# Patient Record
Sex: MALE | Race: WHITE | Employment: OTHER | URBAN - METROPOLITAN AREA
[De-identification: names, ages, dates, MRNs, and addresses within clinical notes are randomized per-mention and may not be internally consistent; named-entity substitution may affect disease eponyms.]

---

## 2022-07-14 ENCOUNTER — HOSPITAL ENCOUNTER (INPATIENT)
Age: 57
LOS: 1 days | Discharge: HOME | DRG: 291 | End: 2022-07-15
Attending: EMERGENCY MEDICINE
Payer: COMMERCIAL

## 2022-07-14 ENCOUNTER — APPOINTMENT (OUTPATIENT)
Dept: GENERAL RADIOLOGY | Age: 57
DRG: 291 | End: 2022-07-14
Payer: COMMERCIAL

## 2022-07-14 DIAGNOSIS — J96.01 ACUTE RESPIRATORY FAILURE WITH HYPOXIA (HCC): ICD-10-CM

## 2022-07-14 DIAGNOSIS — I50.23 ACUTE ON CHRONIC SYSTOLIC CONGESTIVE HEART FAILURE (HCC): Primary | ICD-10-CM

## 2022-07-14 PROBLEM — D64.9 ANEMIA: Status: ACTIVE | Noted: 2022-07-14

## 2022-07-14 PROBLEM — R74.01 ELEVATED LIVER TRANSAMINASE LEVEL: Status: ACTIVE | Noted: 2022-07-14

## 2022-07-14 PROBLEM — F17.200 NICOTINE USE DISORDER: Status: ACTIVE | Noted: 2022-04-14

## 2022-07-14 PROBLEM — E78.5 HYPERLIPIDEMIA: Status: ACTIVE | Noted: 2022-07-14

## 2022-07-14 PROBLEM — R80.9 MICROALBUMINURIA: Status: ACTIVE | Noted: 2022-07-14

## 2022-07-14 PROBLEM — I50.9 CHF (CONGESTIVE HEART FAILURE) (HCC): Status: ACTIVE | Noted: 2022-07-14

## 2022-07-14 PROBLEM — I48.91 ATRIAL FIBRILLATION (HCC): Status: ACTIVE | Noted: 2022-07-14

## 2022-07-14 PROBLEM — I42.8 NONISCHEMIC CARDIOMYOPATHY (HCC): Status: ACTIVE | Noted: 2022-07-14

## 2022-07-14 PROBLEM — N32.89 BLADDER WALL THICKENING: Status: ACTIVE | Noted: 2022-07-14

## 2022-07-14 PROBLEM — E66.9 OBESITY: Status: ACTIVE | Noted: 2022-07-14

## 2022-07-14 PROBLEM — Z79.01 CHRONIC ANTICOAGULATION: Status: ACTIVE | Noted: 2022-07-14

## 2022-07-14 PROBLEM — I10 HYPERTENSION: Status: ACTIVE | Noted: 2022-07-14

## 2022-07-14 PROBLEM — I34.0 MITRAL VALVE REGURGITATION: Status: ACTIVE | Noted: 2022-07-14

## 2022-07-14 PROBLEM — E11.9 TYPE 2 DIABETES MELLITUS (HCC): Status: ACTIVE | Noted: 2022-07-14

## 2022-07-14 PROBLEM — R60.0 BILATERAL LOWER EXTREMITY EDEMA: Status: ACTIVE | Noted: 2022-07-14

## 2022-07-14 PROBLEM — R06.00 DYSPNEA: Status: ACTIVE | Noted: 2022-04-13

## 2022-07-14 LAB
ABSOLUTE EOS #: 0.2 K/UL (ref 0–0.4)
ABSOLUTE LYMPH #: 0.7 K/UL (ref 1–4.8)
ABSOLUTE MONO #: 0.3 K/UL (ref 0.1–1.3)
ALBUMIN SERPL-MCNC: 4.3 G/DL (ref 3.5–5.2)
ALP BLD-CCNC: 118 U/L (ref 40–129)
ALT SERPL-CCNC: 36 U/L (ref 5–41)
ANION GAP SERPL CALCULATED.3IONS-SCNC: 11 MMOL/L (ref 9–17)
AST SERPL-CCNC: 33 U/L
BASOPHILS # BLD: 0 % (ref 0–2)
BASOPHILS ABSOLUTE: 0 K/UL (ref 0–0.2)
BILIRUB SERPL-MCNC: 0.72 MG/DL (ref 0.3–1.2)
BUN BLDV-MCNC: 21 MG/DL (ref 6–20)
CALCIUM SERPL-MCNC: 8.7 MG/DL (ref 8.6–10.4)
CHLORIDE BLD-SCNC: 104 MMOL/L (ref 98–107)
CO2: 23 MMOL/L (ref 20–31)
CREAT SERPL-MCNC: 1.4 MG/DL (ref 0.7–1.2)
EOSINOPHILS RELATIVE PERCENT: 2 % (ref 0–4)
GFR AFRICAN AMERICAN: >60 ML/MIN
GFR NON-AFRICAN AMERICAN: 52 ML/MIN
GFR SERPL CREATININE-BSD FRML MDRD: ABNORMAL ML/MIN/{1.73_M2}
GLUCOSE BLD-MCNC: 313 MG/DL (ref 70–99)
HCT VFR BLD CALC: 34.3 % (ref 41–53)
HEMOGLOBIN: 11.1 G/DL (ref 13.5–17.5)
INFLUENZA A: NOT DETECTED
INFLUENZA B: NOT DETECTED
INR BLD: 2.9
LYMPHOCYTES # BLD: 9 % (ref 24–44)
MAGNESIUM: 1.8 MG/DL (ref 1.6–2.6)
MCH RBC QN AUTO: 31 PG (ref 26–34)
MCHC RBC AUTO-ENTMCNC: 32.5 G/DL (ref 31–37)
MCV RBC AUTO: 95.2 FL (ref 80–100)
MONOCYTES # BLD: 4 % (ref 1–7)
PDW BLD-RTO: 16.2 % (ref 11.5–14.9)
PLATELET # BLD: 200 K/UL (ref 150–450)
PMV BLD AUTO: 8.8 FL (ref 6–12)
POTASSIUM SERPL-SCNC: 5.1 MMOL/L (ref 3.7–5.3)
PRO-BNP: 691 PG/ML
PROTHROMBIN TIME: 30.5 SEC (ref 11.8–14.6)
RBC # BLD: 3.6 M/UL (ref 4.5–5.9)
SARS-COV-2 RNA, RT PCR: NOT DETECTED
SEG NEUTROPHILS: 85 % (ref 36–66)
SEGMENTED NEUTROPHILS ABSOLUTE COUNT: 6.5 K/UL (ref 1.3–9.1)
SODIUM BLD-SCNC: 138 MMOL/L (ref 135–144)
SOURCE: NORMAL
SPECIMEN DESCRIPTION: NORMAL
TOTAL PROTEIN: 6.8 G/DL (ref 6.4–8.3)
TROPONIN, HIGH SENSITIVITY: 14 NG/L (ref 0–22)
TROPONIN, HIGH SENSITIVITY: 14 NG/L (ref 0–22)
WBC # BLD: 7.8 K/UL (ref 3.5–11)

## 2022-07-14 PROCEDURE — 83880 ASSAY OF NATRIURETIC PEPTIDE: CPT

## 2022-07-14 PROCEDURE — 6370000000 HC RX 637 (ALT 250 FOR IP): Performed by: EMERGENCY MEDICINE

## 2022-07-14 PROCEDURE — 82800 BLOOD PH: CPT

## 2022-07-14 PROCEDURE — 87636 SARSCOV2 & INF A&B AMP PRB: CPT

## 2022-07-14 PROCEDURE — 36415 COLL VENOUS BLD VENIPUNCTURE: CPT

## 2022-07-14 PROCEDURE — 94640 AIRWAY INHALATION TREATMENT: CPT

## 2022-07-14 PROCEDURE — 2060000000 HC ICU INTERMEDIATE R&B

## 2022-07-14 PROCEDURE — 84484 ASSAY OF TROPONIN QUANT: CPT

## 2022-07-14 PROCEDURE — 85025 COMPLETE CBC W/AUTO DIFF WBC: CPT

## 2022-07-14 PROCEDURE — 83735 ASSAY OF MAGNESIUM: CPT

## 2022-07-14 PROCEDURE — 99285 EMERGENCY DEPT VISIT HI MDM: CPT

## 2022-07-14 PROCEDURE — 2700000000 HC OXYGEN THERAPY PER DAY

## 2022-07-14 PROCEDURE — 99223 1ST HOSP IP/OBS HIGH 75: CPT | Performed by: INTERNAL MEDICINE

## 2022-07-14 PROCEDURE — 94660 CPAP INITIATION&MGMT: CPT

## 2022-07-14 PROCEDURE — 93005 ELECTROCARDIOGRAM TRACING: CPT | Performed by: EMERGENCY MEDICINE

## 2022-07-14 PROCEDURE — 6370000000 HC RX 637 (ALT 250 FOR IP): Performed by: NURSE PRACTITIONER

## 2022-07-14 PROCEDURE — 80053 COMPREHEN METABOLIC PANEL: CPT

## 2022-07-14 PROCEDURE — 6360000002 HC RX W HCPCS: Performed by: NURSE PRACTITIONER

## 2022-07-14 PROCEDURE — 71045 X-RAY EXAM CHEST 1 VIEW: CPT

## 2022-07-14 PROCEDURE — 85610 PROTHROMBIN TIME: CPT

## 2022-07-14 RX ORDER — ONDANSETRON 4 MG/1
4 TABLET, ORALLY DISINTEGRATING ORAL EVERY 8 HOURS PRN
Status: DISCONTINUED | OUTPATIENT
Start: 2022-07-14 | End: 2022-07-15 | Stop reason: HOSPADM

## 2022-07-14 RX ORDER — FUROSEMIDE 10 MG/ML
40 INJECTION INTRAMUSCULAR; INTRAVENOUS 2 TIMES DAILY
Status: DISCONTINUED | OUTPATIENT
Start: 2022-07-14 | End: 2022-07-15 | Stop reason: HOSPADM

## 2022-07-14 RX ORDER — LANOLIN ALCOHOL/MO/W.PET/CERES
800 CREAM (GRAM) TOPICAL DAILY
Status: DISCONTINUED | OUTPATIENT
Start: 2022-07-15 | End: 2022-07-15 | Stop reason: HOSPADM

## 2022-07-14 RX ORDER — SACUBITRIL AND VALSARTAN 97; 103 MG/1; MG/1
1 TABLET, FILM COATED ORAL 2 TIMES DAILY
COMMUNITY
Start: 2022-01-27

## 2022-07-14 RX ORDER — TORSEMIDE 20 MG/1
20 TABLET ORAL DAILY
COMMUNITY
Start: 2022-04-14

## 2022-07-14 RX ORDER — WARFARIN SODIUM 5 MG/1
10 TABLET ORAL EVERY EVENING
COMMUNITY
Start: 2022-06-22

## 2022-07-14 RX ORDER — AMIODARONE HYDROCHLORIDE 100 MG/1
100 TABLET ORAL SEE ADMIN INSTRUCTIONS
COMMUNITY

## 2022-07-14 RX ORDER — ONDANSETRON 2 MG/ML
4 INJECTION INTRAMUSCULAR; INTRAVENOUS EVERY 6 HOURS PRN
Status: DISCONTINUED | OUTPATIENT
Start: 2022-07-14 | End: 2022-07-15 | Stop reason: HOSPADM

## 2022-07-14 RX ORDER — SODIUM CHLORIDE 9 MG/ML
INJECTION, SOLUTION INTRAVENOUS PRN
Status: DISCONTINUED | OUTPATIENT
Start: 2022-07-14 | End: 2022-07-15 | Stop reason: HOSPADM

## 2022-07-14 RX ORDER — CARVEDILOL 25 MG/1
25 TABLET ORAL 2 TIMES DAILY
Status: DISCONTINUED | OUTPATIENT
Start: 2022-07-14 | End: 2022-07-15 | Stop reason: HOSPADM

## 2022-07-14 RX ORDER — SODIUM CHLORIDE 0.9 % (FLUSH) 0.9 %
5-40 SYRINGE (ML) INJECTION EVERY 12 HOURS SCHEDULED
Status: DISCONTINUED | OUTPATIENT
Start: 2022-07-14 | End: 2022-07-15 | Stop reason: HOSPADM

## 2022-07-14 RX ORDER — ENOXAPARIN SODIUM 100 MG/ML
40 INJECTION SUBCUTANEOUS DAILY
Status: DISCONTINUED | OUTPATIENT
Start: 2022-07-15 | End: 2022-07-14 | Stop reason: ALTCHOICE

## 2022-07-14 RX ORDER — ATORVASTATIN CALCIUM 80 MG/1
80 TABLET, FILM COATED ORAL NIGHTLY
COMMUNITY
Start: 2022-01-27

## 2022-07-14 RX ORDER — SPIRONOLACTONE 25 MG/1
12.5 TABLET ORAL DAILY
Status: DISCONTINUED | OUTPATIENT
Start: 2022-07-15 | End: 2022-07-15 | Stop reason: HOSPADM

## 2022-07-14 RX ORDER — SODIUM CHLORIDE 0.9 % (FLUSH) 0.9 %
10 SYRINGE (ML) INJECTION PRN
Status: DISCONTINUED | OUTPATIENT
Start: 2022-07-14 | End: 2022-07-15 | Stop reason: HOSPADM

## 2022-07-14 RX ORDER — CARVEDILOL 25 MG/1
25 TABLET ORAL 2 TIMES DAILY
COMMUNITY
Start: 2022-02-09 | End: 2022-08-08

## 2022-07-14 RX ORDER — ACETAMINOPHEN 650 MG/1
650 SUPPOSITORY RECTAL EVERY 6 HOURS PRN
Status: DISCONTINUED | OUTPATIENT
Start: 2022-07-14 | End: 2022-07-15 | Stop reason: HOSPADM

## 2022-07-14 RX ORDER — MAGNESIUM OXIDE 400 MG/1
800 TABLET ORAL DAILY
COMMUNITY
Start: 2021-08-06

## 2022-07-14 RX ORDER — ATORVASTATIN CALCIUM 80 MG/1
80 TABLET, FILM COATED ORAL NIGHTLY
Status: DISCONTINUED | OUTPATIENT
Start: 2022-07-14 | End: 2022-07-15 | Stop reason: HOSPADM

## 2022-07-14 RX ORDER — SPIRONOLACTONE 25 MG/1
12.5 TABLET ORAL DAILY
COMMUNITY
Start: 2021-11-24

## 2022-07-14 RX ORDER — AMIODARONE HYDROCHLORIDE 200 MG/1
200 TABLET ORAL
Status: DISCONTINUED | OUTPATIENT
Start: 2022-07-15 | End: 2022-07-15 | Stop reason: HOSPADM

## 2022-07-14 RX ORDER — M-VIT,TX,IRON,MINS/CALC/FOLIC 27MG-0.4MG
1 TABLET ORAL DAILY
Status: DISCONTINUED | OUTPATIENT
Start: 2022-07-15 | End: 2022-07-15 | Stop reason: HOSPADM

## 2022-07-14 RX ORDER — M-VIT,TX,IRON,MINS/CALC/FOLIC 27MG-0.4MG
1 TABLET ORAL DAILY
COMMUNITY

## 2022-07-14 RX ORDER — GLIMEPIRIDE 1 MG/1
1 TABLET ORAL
COMMUNITY
Start: 2022-02-09

## 2022-07-14 RX ORDER — ACETAMINOPHEN 325 MG/1
650 TABLET ORAL EVERY 6 HOURS PRN
Status: DISCONTINUED | OUTPATIENT
Start: 2022-07-14 | End: 2022-07-15 | Stop reason: HOSPADM

## 2022-07-14 RX ORDER — AMIODARONE HYDROCHLORIDE 200 MG/1
100 TABLET ORAL
Status: DISCONTINUED | OUTPATIENT
Start: 2022-07-16 | End: 2022-07-15 | Stop reason: HOSPADM

## 2022-07-14 RX ORDER — IPRATROPIUM BROMIDE AND ALBUTEROL SULFATE 2.5; .5 MG/3ML; MG/3ML
1 SOLUTION RESPIRATORY (INHALATION) EVERY 4 HOURS PRN
Status: DISCONTINUED | OUTPATIENT
Start: 2022-07-14 | End: 2022-07-15 | Stop reason: HOSPADM

## 2022-07-14 RX ORDER — POLYETHYLENE GLYCOL 3350 17 G/17G
17 POWDER, FOR SOLUTION ORAL DAILY PRN
Status: DISCONTINUED | OUTPATIENT
Start: 2022-07-14 | End: 2022-07-15 | Stop reason: HOSPADM

## 2022-07-14 RX ORDER — AMIODARONE HYDROCHLORIDE 200 MG/1
200 TABLET ORAL SEE ADMIN INSTRUCTIONS
COMMUNITY
Start: 2022-01-05

## 2022-07-14 RX ORDER — GLIPIZIDE 5 MG/1
2.5 TABLET ORAL
Status: DISCONTINUED | OUTPATIENT
Start: 2022-07-15 | End: 2022-07-15 | Stop reason: HOSPADM

## 2022-07-14 RX ADMIN — FUROSEMIDE 40 MG: 10 INJECTION, SOLUTION INTRAMUSCULAR; INTRAVENOUS at 23:07

## 2022-07-14 RX ADMIN — IPRATROPIUM BROMIDE AND ALBUTEROL SULFATE 1 AMPULE: 2.5; .5 SOLUTION RESPIRATORY (INHALATION) at 20:14

## 2022-07-14 RX ADMIN — ATORVASTATIN CALCIUM 80 MG: 80 TABLET, FILM COATED ORAL at 23:52

## 2022-07-14 RX ADMIN — CARVEDILOL 25 MG: 25 TABLET, FILM COATED ORAL at 23:53

## 2022-07-14 ASSESSMENT — ENCOUNTER SYMPTOMS
CONSTIPATION: 0
BACK PAIN: 0
NAUSEA: 0
VOMITING: 0
COUGH: 1
SORE THROAT: 0
COUGH: 0
WHEEZING: 0
DIARRHEA: 0
ABDOMINAL PAIN: 0
SHORTNESS OF BREATH: 1

## 2022-07-14 ASSESSMENT — PAIN - FUNCTIONAL ASSESSMENT: PAIN_FUNCTIONAL_ASSESSMENT: NONE - DENIES PAIN

## 2022-07-15 ENCOUNTER — APPOINTMENT (OUTPATIENT)
Dept: NON INVASIVE DIAGNOSTICS | Age: 57
DRG: 291 | End: 2022-07-15
Payer: COMMERCIAL

## 2022-07-15 VITALS
WEIGHT: 315 LBS | BODY MASS INDEX: 40.43 KG/M2 | HEIGHT: 74 IN | OXYGEN SATURATION: 95 % | RESPIRATION RATE: 18 BRPM | SYSTOLIC BLOOD PRESSURE: 121 MMHG | TEMPERATURE: 98.1 F | DIASTOLIC BLOOD PRESSURE: 74 MMHG | HEART RATE: 66 BPM

## 2022-07-15 LAB
ANION GAP SERPL CALCULATED.3IONS-SCNC: 9 MMOL/L (ref 9–17)
BUN BLDV-MCNC: 20 MG/DL (ref 6–20)
CALCIUM SERPL-MCNC: 8.6 MG/DL (ref 8.6–10.4)
CHLORIDE BLD-SCNC: 108 MMOL/L (ref 98–107)
CHOLESTEROL/HDL RATIO: 3.5
CHOLESTEROL: 121 MG/DL
CO2: 25 MMOL/L (ref 20–31)
CREAT SERPL-MCNC: 1.25 MG/DL (ref 0.7–1.2)
EKG ATRIAL RATE: 80 BPM
EKG P AXIS: 49 DEGREES
EKG P-R INTERVAL: 216 MS
EKG Q-T INTERVAL: 448 MS
EKG QRS DURATION: 146 MS
EKG QTC CALCULATION (BAZETT): 516 MS
EKG R AXIS: -47 DEGREES
EKG T AXIS: 82 DEGREES
EKG VENTRICULAR RATE: 80 BPM
ESTIMATED AVERAGE GLUCOSE: 146 MG/DL
GFR AFRICAN AMERICAN: >60 ML/MIN
GFR NON-AFRICAN AMERICAN: 59 ML/MIN
GFR SERPL CREATININE-BSD FRML MDRD: ABNORMAL ML/MIN/{1.73_M2}
GLUCOSE BLD-MCNC: 100 MG/DL (ref 75–110)
GLUCOSE BLD-MCNC: 112 MG/DL (ref 75–110)
GLUCOSE BLD-MCNC: 80 MG/DL (ref 75–110)
GLUCOSE BLD-MCNC: 84 MG/DL (ref 70–99)
HBA1C MFR BLD: 6.7 % (ref 4–6)
HCT VFR BLD CALC: 31.5 % (ref 41–53)
HDLC SERPL-MCNC: 35 MG/DL
HEMOGLOBIN: 10.6 G/DL (ref 13.5–17.5)
INR BLD: 2.6
LDL CHOLESTEROL: 55 MG/DL (ref 0–130)
LV EF: 45 %
LVEF MODALITY: NORMAL
MAGNESIUM: 1.9 MG/DL (ref 1.6–2.6)
MCH RBC QN AUTO: 31.5 PG (ref 26–34)
MCHC RBC AUTO-ENTMCNC: 33.5 G/DL (ref 31–37)
MCV RBC AUTO: 94 FL (ref 80–100)
PDW BLD-RTO: 16.2 % (ref 11.5–14.9)
PLATELET # BLD: 164 K/UL (ref 150–450)
PMV BLD AUTO: 9.3 FL (ref 6–12)
POTASSIUM SERPL-SCNC: 4.8 MMOL/L (ref 3.7–5.3)
PRO-BNP: 874 PG/ML
PROTHROMBIN TIME: 27.4 SEC (ref 11.8–14.6)
RBC # BLD: 3.35 M/UL (ref 4.5–5.9)
SODIUM BLD-SCNC: 142 MMOL/L (ref 135–144)
TRIGL SERPL-MCNC: 156 MG/DL
TSH SERPL DL<=0.05 MIU/L-ACNC: 2.94 UIU/ML (ref 0.3–5)
WBC # BLD: 7.2 K/UL (ref 3.5–11)

## 2022-07-15 PROCEDURE — 85027 COMPLETE CBC AUTOMATED: CPT

## 2022-07-15 PROCEDURE — 80048 BASIC METABOLIC PNL TOTAL CA: CPT

## 2022-07-15 PROCEDURE — 6360000004 HC RX CONTRAST MEDICATION: Performed by: INTERNAL MEDICINE

## 2022-07-15 PROCEDURE — 85610 PROTHROMBIN TIME: CPT

## 2022-07-15 PROCEDURE — 36415 COLL VENOUS BLD VENIPUNCTURE: CPT

## 2022-07-15 PROCEDURE — C8929 TTE W OR WO FOL WCON,DOPPLER: HCPCS

## 2022-07-15 PROCEDURE — 80061 LIPID PANEL: CPT

## 2022-07-15 PROCEDURE — 6360000002 HC RX W HCPCS: Performed by: NURSE PRACTITIONER

## 2022-07-15 PROCEDURE — 82947 ASSAY GLUCOSE BLOOD QUANT: CPT

## 2022-07-15 PROCEDURE — 6370000000 HC RX 637 (ALT 250 FOR IP): Performed by: NURSE PRACTITIONER

## 2022-07-15 PROCEDURE — 83735 ASSAY OF MAGNESIUM: CPT

## 2022-07-15 PROCEDURE — 84443 ASSAY THYROID STIM HORMONE: CPT

## 2022-07-15 PROCEDURE — 93010 ELECTROCARDIOGRAM REPORT: CPT | Performed by: INTERNAL MEDICINE

## 2022-07-15 PROCEDURE — 2580000003 HC RX 258: Performed by: NURSE PRACTITIONER

## 2022-07-15 PROCEDURE — 83036 HEMOGLOBIN GLYCOSYLATED A1C: CPT

## 2022-07-15 PROCEDURE — 83880 ASSAY OF NATRIURETIC PEPTIDE: CPT

## 2022-07-15 RX ORDER — WARFARIN SODIUM 10 MG/1
10 TABLET ORAL
Status: COMPLETED | OUTPATIENT
Start: 2022-07-15 | End: 2022-07-15

## 2022-07-15 RX ORDER — DEXTROSE MONOHYDRATE 50 MG/ML
100 INJECTION, SOLUTION INTRAVENOUS PRN
Status: DISCONTINUED | OUTPATIENT
Start: 2022-07-15 | End: 2022-07-15 | Stop reason: HOSPADM

## 2022-07-15 RX ORDER — WARFARIN SODIUM 10 MG/1
10 TABLET ORAL
Status: DISCONTINUED | OUTPATIENT
Start: 2022-07-15 | End: 2022-07-15 | Stop reason: HOSPADM

## 2022-07-15 RX ADMIN — WARFARIN SODIUM 10 MG: 10 TABLET ORAL at 00:48

## 2022-07-15 RX ADMIN — SACUBITRIL AND VALSARTAN 2 TABLET: 49; 51 TABLET, FILM COATED ORAL at 09:11

## 2022-07-15 RX ADMIN — SODIUM CHLORIDE, PRESERVATIVE FREE 10 ML: 5 INJECTION INTRAVENOUS at 00:02

## 2022-07-15 RX ADMIN — Medication 800 MG: at 09:12

## 2022-07-15 RX ADMIN — AMIODARONE HYDROCHLORIDE 200 MG: 200 TABLET ORAL at 09:11

## 2022-07-15 RX ADMIN — PERFLUTREN 3 ML: 6.52 INJECTION, SUSPENSION INTRAVENOUS at 08:52

## 2022-07-15 RX ADMIN — SPIRONOLACTONE 12.5 MG: 25 TABLET ORAL at 09:12

## 2022-07-15 RX ADMIN — SODIUM CHLORIDE, PRESERVATIVE FREE 10 ML: 5 INJECTION INTRAVENOUS at 09:13

## 2022-07-15 RX ADMIN — SACUBITRIL AND VALSARTAN 2 TABLET: 49; 51 TABLET, FILM COATED ORAL at 01:13

## 2022-07-15 RX ADMIN — FUROSEMIDE 40 MG: 10 INJECTION, SOLUTION INTRAMUSCULAR; INTRAVENOUS at 09:12

## 2022-07-15 RX ADMIN — CARVEDILOL 25 MG: 25 TABLET, FILM COATED ORAL at 09:12

## 2022-07-15 RX ADMIN — METFORMIN HYDROCHLORIDE 1000 MG: 1000 TABLET ORAL at 09:11

## 2022-07-15 RX ADMIN — GLIPIZIDE 2.5 MG: 5 TABLET ORAL at 09:11

## 2022-07-15 RX ADMIN — MULTIPLE VITAMINS W/ MINERALS TAB 1 TABLET: TAB at 09:12

## 2022-07-15 NOTE — ED NOTES
At pt's request, he was taken off Bi-PAP by Respiratory and is statting 95% on room air, speaking in full sentences.      Megha Kenney RN  07/14/22 3082

## 2022-07-15 NOTE — PLAN OF CARE
Problem: Discharge Planning  Goal: Discharge to home or other facility with appropriate resources  7/15/2022 1316 by Yolis Villela RN  Outcome: Completed  Flowsheets (Taken 7/15/2022 0825)  Discharge to home or other facility with appropriate resources:   Identify barriers to discharge with patient and caregiver   Arrange for needed discharge resources and transportation as appropriate   Identify discharge learning needs (meds, wound care, etc)   Refer to discharge planning if patient needs post-hospital services based on physician order or complex needs related to functional status, cognitive ability or social support system  7/15/2022 0437 by Loyda Wyman RN  Outcome: Progressing     Problem: Safety - Adult  Goal: Free from fall injury  7/15/2022 1316 by Yolis Villela RN  Outcome: Completed  Flowsheets (Taken 7/15/2022 1014)  Free From Fall Injury:   Instruct family/caregiver on patient safety   Based on caregiver fall risk screen, instruct family/caregiver to ask for assistance with transferring infant if caregiver noted to have fall risk factors  7/15/2022 0437 by Loyda Wyman RN  Outcome: Progressing

## 2022-07-15 NOTE — CARE COORDINATION
CASE MANAGEMENT NOTE:    Admission Date:  7/14/2022 Smitha Campbell is a 60 y.o.  male    Admitted for : Acute on chronic systolic congestive heart failure (HCC) [I50.23]  Acute respiratory failure with hypoxia ([facility]) [J96.01]  Acute on chronic systolic CHF (congestive heart failure) ([facility] [I50.23]    Met with:  Patient    PCP:  Kimberley Ayala                                Insurance:  Roderick Aguilar      Is patient alert and oriented at time of discussion:  Yes    Current Residence/ Living Arrangements:  independently at home w/ Wife            Current Services PTA:  Yes, Follows at [facility] for his CHF & His Coumadin    Does patient go to outpatient dialysis: No  If yes, location and chair time: NA    Is patient agreeable to VNS: No    Freedom of choice provided:  No    List of [address] provided: No    VNS chosen:  No    DME:  none    Home Oxygen: No    Nebulizer: No    CPAP/BIPAP: No    Supplier: N/A    Potential Assistance Needed: No    SNF needed: No    Freedom of choice and list provided: No    Pharmacy:  [facility] in [address] [place] [address] Rd       Is patient currently receiving oral anticoagulation therapy? No    Is the Patient an FRANCO[facility] with Readmission Risk Score greater than 14%? No  If yes, pt needs a follow up appointment made within 7 days. Family Members/Caregivers that pt would like involved in their care:    Yes    If yes, list name here: Wife Evert Melo    Transportation Provider:  Patient             Discharge Plan:  7/15/22 [employer] Pt. Lives at home w/ Wife. No DME, Denies VNS. Follows at [facility] for his CHF/Coumadin Clinic, INR 2.6. , Echo, CXR, IV Lasix, 40 BID.  Denies needs, will follow//KB                 Electronically signed by: Julia Ayala RN on 7/15/2022 at 9:36 AM

## 2022-07-15 NOTE — ED PROVIDER NOTES
EMERGENCY DEPARTMENT ENCOUNTER    Pt Name: Priyank Lemos  MRN: [id number]  [place] 1965  Date of evaluation: 7/14/22  CHIEF COMPLAINT       Chief Complaint   Patient presents with    Shortness of Breath     HISTORY OF PRESENT ILLNESS     Shortness of Breath  Severity:  Severe  Onset quality:  Gradual  Duration:  1 day  Timing:  Constant  Progression:  Worsening  Chronicity:  Recurrent (CHF)  Context comment:  Was at [employer], missed water pill today and yesterday, feels like he has a lot of water weight on, goes to [address] [place] [address] clinic for CHF, at [facility] today, he drove from [place] [address] by:  Nothing  Worsened by:  Exertion  Ineffective treatments:  None tried  Associated symptoms: no chest pain and no cough      Arrived by EMS on CPAP        REVIEW OF SYSTEMS     Review of Systems   Constitutional: Positive for unexpected weight change. Respiratory: Positive for shortness of breath. Negative for cough. Cardiovascular: Positive for leg swelling. Negative for chest pain. All other systems reviewed and are negative. PASTMEDICAL HISTORY     Past Medical History:   Diagnosis Date    [facility]est[facility]) [facility]     Diabetes mellitus ([facility]     Hyperlipidemia     Hypertension      Past Problem List  Patient Active Problem List   Diagnosis Code    Equinus deformity of foot, acquired M21.6X9     SURGICAL HISTORY       Past Surgical History:   Procedure Laterality Date    JOINT REPLACEMENT      reconstruction of the elbow     CURRENT MEDICATIONS       Previous Medications    METFORMIN (GLUCOPHAGE) 500 MG TABLET    Take 500 mg by mouth daily Indications: Extended release     ALLERGIES     has No Known Allergies. FAMILY HISTORY     has no family status information on file. SOCIAL HISTORY       Social History     Tobacco Use    Smoking status: Current Every Day Smoker     Packs/day: 0.50    Smokeless tobacco: Current User   Substance Use Topics    Alcohol use:  Yes     Alcohol/week: 12.0 standard drinks     Types: 12 Cans of beer per week    Drug use: No     PHYSICAL EXAM     INITIAL VITALS: /66   Pulse 81   Temp 98.1 °F (36.7 °C) (Axillary)   Resp 21   Ht 6' 2\" (1.88 m)   Wt (!) 325 lb (147.4 kg)   SpO2 100%   BMI 41.73 kg/m²    Physical Exam  Constitutional:       General: He is in acute distress. Appearance: Normal appearance. He is well-developed. He is obese. He is not ill-appearing, toxic-appearing or diaphoretic. HENT:      Head: Normocephalic and atraumatic. Right Ear: External ear normal.      Left Ear: External ear normal.      Nose: Nose normal. No congestion. Mouth/Throat:      Mouth: Mucous membranes are moist.      Pharynx: Oropharynx is clear. Eyes:      General:         Right eye: No discharge. Left eye: No discharge. Conjunctiva/sclera: Conjunctivae normal.      Pupils: Pupils are equal, round, and reactive to light. Neck:      Trachea: No tracheal deviation. Cardiovascular:      Rate and Rhythm: Normal rate and regular rhythm. Pulses: Normal pulses. Heart sounds: Normal heart sounds. Pulmonary:      Effort: Respiratory distress present. Breath sounds: Normal breath sounds. No stridor. No wheezing or rales. Comments: On bipap  Abdominal:      Palpations: Abdomen is soft. Tenderness: There is no abdominal tenderness. There is no guarding or rebound. Musculoskeletal:         General: No tenderness or deformity. Normal range of motion. Cervical back: Normal range of motion and neck supple. Right lower leg: Edema present. Left lower leg: Edema present. Skin:     General: Skin is warm and dry. Capillary Refill: Capillary refill takes less than 2 seconds. Findings: No erythema or rash. Neurological:      General: No focal deficit present. Mental Status: He is alert and oriented to person, place, and time.       Coordination: Coordination normal.   Psychiatric:         Mood and Affect: Mood normal.         Behavior: Behavior normal.         Thought Content: Thought content normal.         Judgment: Judgment normal.         MEDICAL DECISION MAKING:     Put on bipap, doing well, titrating down oxygen       CRITICAL CARE:   Due to the immediate potential for life-threatening deterioration due to acute respiratory failure, I spent 35 minutes providing critical care. This time is excluding time spent performing procedures. PROCEDURES:    Procedures      9:13 PM EDT  Reviewed labs and imaging  Admitting to intermediate icu to medicine on call, NIKKI Manley  DIAGNOSTIC RESULTS   EKG:All EKG's are interpreted by the Emergency Department Physician who either signs or Co-signs this chart in the absence of a cardiologist.  SR, 1st degree av block, nonspecific changes, no acute ischemic changes on ST segments, LBBB, no old available, normal rate and normal intervals        RADIOLOGY:All plain film, CT, MRI, and formal ultrasound images (except ED bedside ultrasound) are read by the radiologist, see reports below, unless otherwisenoted in MDM or here. XR CHEST PORTABLE   Final Result   Interstitial prominence and bibasilar pulmonary opacities, concerning for   edema or pneumonia. LABS: All lab results were reviewed by myself, and all abnormals are listed below.   Labs Reviewed   CBC WITH AUTO DIFFERENTIAL - Abnormal; Notable for the following components:       Result Value    RBC 3.60 (*)     Hemoglobin 11.1 (*)     Hematocrit 34.3 (*)     RDW 16.2 (*)     Seg Neutrophils 85 (*)     Lymphocytes 9 (*)     Absolute Lymph # 0.70 (*)     All other components within normal limits   COMPREHENSIVE METABOLIC PANEL - Abnormal; Notable for the following components:    Glucose 313 (*)     BUN 21 (*)     CREATININE 1.40 (*)     GFR Non- 52 (*)     All other components within normal limits   PROTIME-INR - Abnormal; Notable for the following components:    Protime 30.5 (*)     All other components within normal limits   BRAIN NATRIURETIC PEPTIDE - Abnormal; Notable for the following components:    Pro- (*)     All other components within normal limits   COVID-19 & INFLUENZA COMBO   TROPONIN   MAGNESIUM   TROPONIN       EMERGENCY DEPARTMENTCOURSE:         Vitals:    Vitals:    07/14/22 2010 07/14/22 2013 07/14/22 2015 07/14/22 2016   BP:   120/66 120/66   Pulse: 86 81 81    Resp: 25 20 21    Temp:       TempSrc:       SpO2: 100% 100% 100%    Weight:       Height:           The patient was given the following medications while in the emergency department:  Orders Placed This Encounter   Medications    ipratropium-albuterol (DUONEB) nebulizer solution 1 ampule     Order Specific Question:   Initiate RT Bronchodilator Protocol     Answer:   Yes       FINAL IMPRESSION      1. Acute on chronic systolic congestive heart failure ([address]    2. Acute respiratory failure with hypoxia (HCC)          DISPOSITION/PLAN   DISPOSITION        PATIENT REFERRED TO:  No follow-up provider specified. DISCHARGE MEDICATIONS:  New Prescriptions    No medications on file     The care is provided during an unprecedented national emergency due to the novel coronavirus, COVID 19.   MD Ibeth Doss MD  07/14/22 4858

## 2022-07-15 NOTE — PROGRESS NOTES
Pharmacy Note  Warfarin Consult    Rola Harvey is a 60 y.o. male for whom pharmacy has been consulted to manage warfarin therapy. Consulting Practitioner: Diana Castillo CNP  Reason for Admission: CHF    Warfarin dose prior to admission: 15 mg on Tuesday and Friday; 10 mg all other days  Warfarin indication: a-fib  Target INR range: 2-3     Past Medical History:   Diagnosis Date    CHF (congestive heart failure) ([facility]     Diabetes mellitus ([facility]     Hyperlipidemia     Hypertension                 Recent Labs     07/14/22 2025   INR 2.9     Recent Labs     07/14/22 2025   HGB 11.1*   HCT 34.3*          Current warfarin drug-drug interactions: amiodarone, acetaminophen      Date             INR        Dose   07/14/2022       2.9     Last dose on 07/13/2022 - give dose of 10 mg warfarin x 1 tonight     Daily PT/INR while inpatient. Thank you for the consult. Will continue to follow.      Kamila Jimenez [facility]     -7/15/2022 at 12:26 AM

## 2022-07-15 NOTE — DISCHARGE SUMMARY
[place] [id number] Internal Medicine    Discharge Summary     Patient ID: Chanel Morse  :  1965   MRN: [id number]     ACCOUNT:  [de-identified]   Patient's PCP: Cammy Apgar  Admit Date: 2022   Discharge Date: 7/15/2022    Length of Stay: 1  Code Status:  Full Code  Admitting Physician: No admitting provider for patient encounter. Discharge Physician: Milagro Villegas MD     Active Discharge Diagnoses:     Primary Problem  Acute on chronic systolic CHF (congestive heart failure) [facility] Problems    Diagnosis Date Noted    Acute on chronic systolic CHF (congestive heart failure) ([facility] [I50.23] 2022     Priority: Medium    Atrial fibrillation ([address] [I48.91] 2022     Priority: Medium    Chronic anticoagulation [Z79.01] 2022     Priority: Medium    Type 2 diabetes mellitus ([address] [E11.9] 2022     Priority: Medium    Nicotine use disorder [F17.200] 2022     Priority: Medium       Admission Condition:  fair     Discharged Condition: fair    Hospital Stay:     Hospital Course:  Chanel Morse is a 60 y.o. male who was admitted for the management of Acute on chronic systolic CHF (congestive heart failure) ([address] , presented to ER with Shortness of Breath  The patient is a 60 y.o. occasion male, with a history of atrial fibrillation-anticoagulated on warfarin, CHF, hyperlipidemia, HTN, mitral valve regurgitation, nonischemic cardiomyopathy, obesity, DM type II, and nicotine use disorder, who presents with shortness of breath. According to patient, he was visiting the [facility] tonight from near [facility] when he had a sudden onset of severe shortness of breath. Reports that he missed his Toresamide dose today and yesterday and noted that he is carrying some extra water weight. Patient reports that he has had 2 similar episodes in the past and receives treatment for this condition at the [facility]. Symptoms are associated with pitting edema in bilateral lower extremities. Denies fever, chills, chest pain, abdominal pain, nausea, vomiting, diarrhea, and urinary symptoms. Shortness of breath is exacerbated with activity and mildly improved after using someone's rescue inhaler [place] the [place]. Patient was transported on CPAP by EMS, which greatly improved symptoms. Symptoms are reported as constant, severe, sudden onset, and greatly improved since arrival to ED     Diuresed well . Feels better . Echo LVEF 47 %   Lvh          Significant therapeutic interventions:     Significant Diagnostic Studies:   Labs / Micro:        ,     Radiology:    ECHO Complete 2D W Doppler W Color    Result Date: 7/15/2022  [facility] Transthoracic Echocardiography Report (TTE)  Patient Name [address]     Date of Study               07/15/2022               [facility]   Date of      1965  Gender                      Male  Birth   Age          60 year(s)  Race                           Room Number  2087        Height:                     74.02 inch, 188 cm   Corporate ID [id number]    Weight:                     324 pounds, 147.1 kg  #   Patient Acct [de-identified]   BSA:          2.67 m^2      BMI:      41.61  #                                                              kg/m^2   MR #         [id number]      Sonographer                 Clotilde Marcelo   Accession #  [id number]  Interpreting Physician      Indria Hartley 59   Fellow                   Referring Nurse                           Practitioner   Interpreting             Referring Physician         Niurka Mendez  Fellow  Type of Study   TTE procedure:2D Echocardiogram, M-Mode, Doppler, Color Doppler, Contrast  study. Procedure Date Date: 07/15/2022 Start: 08:35 AM Study Location: [address] [place] [address] Indications:Congestive heart failure. History / Tech. Comments: non ischemic CMP DM HLD HTN AF Patient Status: Inpatient Contrast Medium: Definity.  Amount - 3 ml Height: 74.02 inches Weight: 324.24 pounds BSA: 2.67 m^2 BMI: 41.61 kg/m^2 Rhythm: Within normal limits HR: 74 bpm BP: 114/85 mmHg CONCLUSIONS Summary Contrast was utilized on this technically difficult study. Left ventricle size appears mildly increased. Global hypokinesis Estimated LV EF 45 %. Grade III (severe) left ventricular diastolic dysfunction. Mildly increased LV wall thickness Right ventricular dilatation with normal systolic function. Mild Left and Right atrial dilatation. Aortic valve was not well visualized. No aortic stenosis. Normal aortic root dimension. No aortic insufficiency. Normal mitral valve structure and function. Mild mitral regurgitation. Grossly normal tricuspid valve structure and function. Mild to moderate tricuspid regurgitation. Estimated right ventricular systolic pressure is 55 mmHg. Moderately elevated right ventricular systolic pressure. IVC Increased diameter and impaired or no inspiratory variation suggestive of elevated Rt atrial filling pressure. No significant pericardial effusion is seen. Signature ----------------------------------------------------------------------------  Electronically signed by Hiral Rodriguez(Interpreting physician) on  07/15/2022 10:56 AM ---------------------------------------------------------------------------- ----------------------------------------------------------------------------  Electronically signed by Clotilde Marcelo(Sonographer) on 07/15/2022 10:45  AM ---------------------------------------------------------------------------- FINDINGS Left Atrium Left atrial dilatation. Left Ventricle Estimated LV EF 45 %. Grade III (severe) left ventricular diastolic dysfunction. Mild left ventricular hypertrophy. Right Atrium Right atrial dilatation. Right Ventricle Right ventricular dilatation with normal systolic function. Mitral Valve Normal mitral valve structure and function. Mild mitral regurgitation.  Aortic Valve Aortic valve was not well visualized. No aortic insufficiency. No aortic stenosis. Tricuspid Valve Grossly normal tricuspid valve structure and function. Mild to moderate tricuspid regurgitation. Estimated right ventricular systolic pressure is 55 mmHg. Moderately elevated right ventricular systolic pressure. Pulmonic Valve Pulmonic valve not well visualized but Doppler velocities are normal. No pulmonic insufficiency. Pericardial Effusion No significant pericardial effusion is seen. Miscellaneous Normal aortic root dimension. E/e' average 10.9 IVC Increased diameter and impaired or no inspiratory variation. M-mode / 2D Measurements & Calculations:   LVIDd:5.85 cm(3.7 - 5.6 cm)      Diastolic LRPQCD:057 ml  VWZSS:3.3 cm(2.2 - 4.0 cm)       Systolic VLQHUD:954 ml  RJAX:3.1 cm(0.6 - 1.1 cm)        Aortic Root:3.1 cm(2.0 - 3.7 cm)  LVPWd:1.38 cm(0.6 - 1.1 cm)      LA Dimension: 7.2 cm(1.9 - 4.0 cm)  Fractional Shortenin.37 %    LA volume/Index: 98.3 ml /37m^2  Calculated LVEF (%): 47.21 %     LVOT:2.3 cm   Mitral:                                Aortic   Peak E-Wave: 1.42 m/s                  Peak Velocity: 1.98 m/s  Peak A-Wave: 0.36 m/s                  Mean Velocity: 1.48 m/s  E/A Ratio: 3.96                        Peak Gradient: 15.68 mmHg  Peak Gradient: 8.07 mmHg               Mean Gradient: 9 mmHg  Deceleration Time: 99 msec                                          Area (continuity): 2.12 cm^2  MR Velocity: 3.33 m/s                  AV VTI: 46.1 cm   MR VTI: 105 cm   Tricuspid:                             Pulmonic:   Estimated RVSP: 55 mmHg  Peak TR Velocity: 3.16 m/s  Peak TR Gradient: 39.9424 mmHg  Estimated RA Pressure: 15 mmHg                                         Estimated PASP: 54.94 mmHg  Septal Wall E' velocity:0.11 m/s Lateral Wall E' velocity:0.16 m/s    XR CHEST PORTABLE    Result Date: 2022  EXAMINATION: ONE XRAY VIEW OF THE CHEST 2022 8:49 pm COMPARISON: None.  HISTORY: ORDERING SYSTEM PROVIDED HISTORY: CHF discharge is  35 mins in patient examination, evaluation, counseling as well as medication reconciliation, prescriptions for required medications, discharge plan and follow up. Electronically signed by   Erendira Ortega MD  7/15/2022  12:51 PM      Thank you Dr. Pat Boyd for the opportunity to be involved in this patient's care.

## 2022-07-15 NOTE — ED NOTES
Report given to Whitney Valdez RN from [facility]. Report method by phone   The following was reviewed with receiving RN:   Current vital signs:  /66   Pulse 81   Temp 98.1 °F (36.7 °C) (Axillary)   Resp 21   Ht 6' 2\" (1.88 m)   Wt (!) 325 lb (147.4 kg)   SpO2 100%   BMI 41.73 kg/m²                      Any medication or safety alerts were reviewed. Any pending diagnostics and notifications were also reviewed, as well as any safety concerns or issues, abnormal labs, abnormal imaging, and abnormal assessment findings. Questions were answered.             Julian Cisneros RN  07/14/22 1431

## 2022-07-15 NOTE — PROGRESS NOTES
Pharmacy Note  Warfarin Consult follow-up      Recent Labs     07/14/22  2025 07/15/22  0543   INR 2.9 2.6     Recent Labs     07/14/22  2025 07/15/22  0543   HGB 11.1* 10.6*   HCT 34.3* 31.5*    164       Significant Drug-Drug Interactions:    Current warfarin drug-drug interactions: amiodarone, acetaminophen       Date             INR        Dose given previous day  Dose scheduled for today  7/15/2022            2.6       10 mg           10 mg        Notes: Give 10 mg today                    Daily PT/INR while inpatient.       Ciara Thomas, PharmD, EDYTA  PGY1 Pharmacy Resident  7/15/2022 11:36 AM

## 2022-07-15 NOTE — H&P
failure) ([facility] [address] Diabetes mellitus ([facility] [address] Hyperlipidemia, and Hypertension. PAST SURGICAL HISTORY    Patient  has a past surgical history that includes joint replacement. FAMILY HISTORY    Patient family history is not on file. SOCIAL HISTORY    Patient  reports that he has been smoking. He has been smoking about 0.50 packs per day. He uses smokeless tobacco. He reports current alcohol use of about 12.0 standard drinks of alcohol per week. He reports that he does not use drugs. HOME MEDICATIONS        Prior to Admission medications    Medication Sig Start Date End Date Taking? Authorizing Provider   atorvastatin (LIPITOR) 80 MG tablet Take 80 mg by mouth nightly 1/27/22  Yes Historical Provider, MD   carvedilol (COREG) 25 MG tablet Take 25 mg by mouth 2 times daily 2/9/22 8/8/22 Yes Historical Provider, MD   glimepiride (AMARYL) 1 MG tablet Take 1 mg by mouth daily (with breakfast) 2/9/22  Yes Historical Provider, MD   magnesium oxide (MAG-OX) 400 MG tablet Take 800 mg by mouth daily 8/6/21  Yes Historical Provider, MD   sacubitril-valsartan (ENTRESTO)  MG per tablet Take 1 tablet by mouth 2 times daily 1/27/22  Yes Historical Provider, MD   spironolactone (ALDACTONE) 25 MG tablet Take 12.5 mg by mouth daily 11/24/21  Yes Historical Provider, MD   torsemide (DEMADEX) 20 MG tablet Take 20 mg by mouth daily 4/14/22  Yes Historical Provider, MD   warfarin (COUMADIN) 5 MG tablet Take 10 mg by mouth every evening 6/22/22  Yes Historical Provider, MD   amiodarone (CORDARONE) 200 MG tablet Take 200 mg by mouth See Admin Instructions Daily on Monday through Friday.   Patient takes 100 mg on Saturday and Sunday 1/5/22  Yes Historical Provider, MD   Multiple Vitamins-Minerals (THERAPEUTIC MULTIVITAMIN-MINERALS) tablet Take 1 tablet by mouth daily   Yes Historical Provider, MD   amiodarone (PACERONE) 100 MG tablet Take 100 mg by mouth See Admin Instructions On Saturdays and Sundays   Yes Historical Provider, MD   metFORMIN (GLUCOPHAGE) 500 MG tablet Take 1,000 mg by mouth 2 times daily (with meals) Indications: Extended release    Yes Historical Provider, MD       ALLERGIES      Patient has no known allergies. REVIEW OF SYSTEMS     Review of Systems   Constitutional: Positive for unexpected weight change. Negative for chills, diaphoresis and fever. HENT: Negative for congestion and sore throat. Respiratory: Positive for cough and shortness of breath. Negative for wheezing. Cardiovascular: Positive for leg swelling. Negative for chest pain and palpitations. Gastrointestinal: Negative for abdominal pain, constipation, diarrhea, nausea and vomiting. Genitourinary: Negative for dysuria, frequency and urgency. Musculoskeletal: Negative for back pain and myalgias. Skin: Negative for rash. Neurological: Negative for dizziness, weakness and headaches. Psychiatric/Behavioral: The patient is not nervous/anxious. PHYSICAL EXAM      /77   Pulse 65   Temp 97.8 °F (36.6 °C) (Oral)   Resp 17   Ht 6' 2\" (1.88 m)   Wt (!) 325 lb (147.4 kg)   SpO2 98%   BMI 41.73 kg/m²  Body mass index is 41.73 kg/m². Physical Exam  Constitutional:       General: He is not in acute distress. Appearance: He is well-developed. He is obese. He is not diaphoretic. HENT:      Head: Normocephalic and atraumatic. Eyes:      Conjunctiva/sclera: Conjunctivae normal.      Pupils: Pupils are equal, round, and reactive to light. Neck:      Trachea: No tracheal deviation. Cardiovascular:      Rate and Rhythm: Normal rate and regular rhythm. Heart sounds: Normal heart sounds. No murmur heard. No friction rub. No gallop. Pulmonary:      Effort: Pulmonary effort is normal. No respiratory distress. Breath sounds: Rales (Right posterior base) present. No wheezing. Chest:      Chest wall: No tenderness. Abdominal:      General: Bowel sounds are normal. There is no distension.       Palpations: Abdomen is soft. Tenderness: There is no abdominal tenderness. There is no guarding. Musculoskeletal:         General: No tenderness. Normal range of motion. Cervical back: Normal range of motion and neck supple. Right lower leg: Edema present. Left lower leg: Edema present. Lymphadenopathy:      Cervical: No cervical adenopathy. Skin:     General: Skin is warm and dry. Coloration: Skin is not pale. Findings: No erythema or rash. Neurological:      Mental Status: He is alert and oriented to person, place, and time. Motor: No seizure activity. Coordination: Coordination normal.   Psychiatric:         Behavior: Behavior normal.         Thought Content: Thought content normal.       DIAGNOSTICS      EKG:   EKG 12 Lead [[id number]]    Collected: 07/14/22 2017    Updated: 07/14/22 2018     Ventricular Rate 80 BPM    Atrial Rate 80 BPM    P-R Interval 216 ms    QRS Duration 146 ms    Q-T Interval 448 ms    QTc Calculation (Bazett) 516 ms    P Axis 49 degrees    R Axis -47 degrees    T Axis 82 degrees   Narrative:     Sinus rhythm with 1st degree A-V block   Left axis deviation   Left bundle branch block   Abnormal ECG   No previous ECGs available     Labs:  CBC:   Recent Labs     07/14/22 2025   WBC 7.8   HGB 11.1*        BMP:    Recent Labs     07/14/22 2025      K 5.1      CO2 23   BUN 21*   CREATININE 1.40*   GLUCOSE 313*     S. Calcium:  Recent Labs     07/14/22 2025   CALCIUM 8.7     S. Ionized Calcium:No results for input(s): IONCA in the last 72 hours. S. Magnesium:  Recent Labs     07/14/22 2025   MG 1.8     S. Phosphorus:No results for input(s): PHOS in the last 72 hours. S. Glucose:No results for input(s): POCGLU in the last 72 hours.   Glycosylated hemoglobin A1C:   Lab Results   Component Value Date/Time    LABA1C 9.2 05/06/2015 10:50 AM     Hepatic:   Recent Labs     07/14/22 2025   AST 33   ALT 36   ALKPHOS 118     CARDIAC ENZY: Recent Labs     07/14/22 2025 07/14/22  2220   TROPHS 14 14     INR:   Recent Labs     07/14/22 2025   INR 2.9     BNP:   Recent Labs     07/14/22 2025   PROBNP 691*      ABGs: No results for input(s): PH, PCO2, PO2, HCO3, O2SAT in the last 72 hours. Lipids: No results for input(s): CHOL, TRIG, HDL, LDL, LDLCALC in the last 72 hours. Pancreatic functions:No results for input(s): LIPASE, AMYLASE in the last 72 hours. Daved Kallman: No results for input(s): LACTA in the last 72 hours. Thyroid functions: No results found for: T4, TSH   U/A:No results for input(s): NITRITE, COLORU, WBCUA, RBCUA, MUCUS, BACTERIA, CLARITYU, SPECGRAV, LEUKOCYTESUR, BLOODU, GLUCOSEU, AMORPHOUS in the last 72 hours. Invalid input(s): Emil Spikes  Recent Labs     07/14/22 2134   COVID19 Not Detected     Imaging/Diagonstics:     XR CHEST PORTABLE    Result Date: 7/14/2022  EXAMINATION: ONE XRAY VIEW OF THE CHEST 7/14/2022 8:49 pm COMPARISON: None. HISTORY: ORDERING SYSTEM PROVIDED HISTORY: CHF TECHNOLOGIST PROVIDED HISTORY: CHF Reason for Exam: SOB, CHF FINDINGS: Cardiomediastinal silhouette is normal in size. There is no pleural effusion or pneumothorax. There is interstitial prominence and bibasilar pulmonary opacities. No acute osseous abnormality. Interstitial prominence and bibasilar pulmonary opacities, concerning for edema or pneumonia. ASSESSMENT  and  PLAN     Principal Problem:    Acute on chronic systolic CHF (congestive heart failure) (HCC)  Active Problems:    Atrial fibrillation (HCC)    Nicotine use disorder    Type 2 diabetes mellitus (HCC)    Chronic anticoagulation  Resolved Problems:    * No resolved hospital problems.  *    Plan:    Acute on chronic systolic CHF  -Patient takes torsemide 20 mg p.o. daily  --Missed dose yesterday and today  -Began Lasix 40 mg IV bid-first dose now  -Continue home dose Aldactone daily  -Ace Inhibitor -continue home dose Entresto  -Beta blocker -continue home dose Carvedilol  -Daily weights; monitor I&O  -Monitor CBC, BMP and BNP;   --Check Lipids, Mg, Hemoglobin A1c and TSH  -Low sodium diet   -Echocardiogram in December 2021 shows EF 30%  --Check 2D echo in am  -Consider cardiology consult  --Patient follows with [facility]      Chronic Anticoagulation  Patient anticoagulated d/t history of Atrial Fibrillation  -INR -2.9  -Check PT/INR daily  -Pharmacy to dose Coumadin  -monitor for signs of bleeding    Diabetes Mellitus  -Continue home dose oral hypoglycemics/metformin for now  -POCT ac and hs with sliding scale coverage    Tobacco use   -smoking cessation education  -nicotine gum PRN    Consultations:     IP CONSULT TO INTERNAL MEDICINE  PHARMACY TO DOSE WARFARIN  . td    YESICA Vizcaino - CNP   7/14/2022  11:29 PM    Shahida Huynh [address] [place] [address]. Phone  and add on       I have discussed the care of Rome Hood ,   including pertinent history and exam findings,      7/15/22   with the Mayelin Marshall CNP  I have seen and examined the patient and the key elements of all parts of the encounter have been performed by me . I agree with the assessment, plan and orders as documented by the resident. Hospital Problems             Last Modified POA    * (Principal) Acute on chronic systolic CHF (congestive heart failure) ([address] 7/14/2022 Yes    Atrial fibrillation ([address] 7/14/2022 Yes    Overview Signed 7/14/2022 11:28 PM by YESICA Vizcaino - CNP     Formatting of this note might be different from the original.  Dr. Ioana Tang         Nicotine use disorder 7/14/2022 Yes    Type 2 diabetes mellitus ([address] 7/14/2022 Yes    Chronic anticoagulation 7/14/2022 Yes         --decompensated  chf due to non compliance with diuretics . Did not take for 2 days       Medications:      Allergies:  No Known Allergies    Current Meds:   Scheduled Meds:    warfarin placeholder: dosing by pharmacy   Other RX Placeholder    sacubitril-valsartan  2 tablet Oral BID    furosemide  40 mg IntraVENous BID    amiodarone  200 mg Oral Once per day on Mon Tue Wed Thu Fri    [START ON 7/16/2022] amiodarone  100 mg Oral Once per day on Sun Sat    atorvastatin  80 mg Oral Nightly    carvedilol  25 mg Oral BID    glipiZIDE  2.5 mg Oral QAM AC    magnesium oxide  800 mg Oral Daily    therapeutic multivitamin-minerals  1 tablet Oral Daily    spironolactone  12.5 mg Oral Daily    metFORMIN  1,000 mg Oral BID WC    sodium chloride flush  5-40 mL IntraVENous 2 times per day     Continuous Infusions:    dextrose      sodium chloride       PRN Meds: glucose, dextrose bolus **OR** dextrose bolus, glucagon (rDNA), dextrose, ipratropium-albuterol, sodium chloride flush, sodium chloride, ondansetron **OR** ondansetron, polyethylene glycol, acetaminophen **OR** acetaminophen      ---- ;     Alys Schlatter, MD        [id number], [address] [place] [address].    Phone [phone]   Fax: [phone]  Answering Service: [phone]

## 2022-07-20 NOTE — PROGRESS NOTES
Physician Progress Note      PATIENT:               Kae Cee  CSN #:                  [id number]  :                       1965  ADMIT DATE:       2022 8:09 PM  Kirstin Hammer DATE:        7/15/2022 1:23 PM  RESPONDING  PROVIDER #:        Javy Reece MD          QUERY TEXT:    Pt admitted with acute on chronic systolic CHF. Pt noted to also have history   of HTN and non-ischemic CMP. If possible, please document in progress notes   and discharge summary the etiology of CHF, if able to be determined. The medical record reflects the following:  Risk Factors: Hx HTN, non-ischemic CMP, mild mitral regurgitation, mild to   moderate tricuspid regurgitation  Clinical Indicators: admitted for CHF exacerbation in the setting of above   risk factors; ECHO--> Estimated LV EF 45%, grade 3 severe LV diastolic   dysfunction, mild mitral regurgitation, mild to moderate tricuspid   regurgitation;  Treatment: IV lasix, monitoring, ECHO, hospital admission, BNP level  Options provided:  -- CHF due to Hypertensive Heart Disease  -- CHF due to Hypertensive Heart Disease and non-ischemic CMP  -- CHF not due to Hypertension but due to non-ischemic CMP  -- CHF due to Hypertensive Heart Disease and Valvular Heart Disease  -- CHF not due to Hypertension but due to valvular heart disease  -- CHF due to HTN, non-ischemic CMP and valvular disease  -- Other - I will add my own diagnosis  -- Disagree - Not applicable / Not valid  -- Disagree - Clinically unable to determine / Unknown  -- Refer to Clinical Documentation Reviewer    PROVIDER RESPONSE TEXT:    This patient has CHF due to hypertensive heart disease.     Query created by: Jair Araiza on 7/15/2022 12:52 PM      Electronically signed by:  Javy Reece MD 2022 3:50 PM